# Patient Record
(demographics unavailable — no encounter records)

---

## 2024-10-08 NOTE — CONSULT LETTER
[FreeTextEntry1] : Dear Dr. Enrique Mckeon,   I had the pleasure of seeing your patient ALEXANDREA WEBBER in the office today.  My office note is attached. PLEASE READ THE "ASSESSMENT" SECTION OF THE NOTE TO SEE MY IMPRESSION AND PLAN.   Thank you very much for allowing me to participate in the care of your patient.   Sincerely,   De Saez M.D., FAC, FACP Director, Celiac Program at [facility]  of Medicine, [employer] at [facility] Adjunct  of Medicine, [facility] Practice Director, [facility] - Gastroenterology at [address] - [address] [place] [address] Tel: [phone] Email: [email or web address]     The attached note has been created using a voice recognition system ([email or web address]).  There may be some misspellings and typos.  Please call my office if you have any issues or questions.

## 2024-10-08 NOTE — ASSESSMENT
[FreeTextEntry1] : Patient with H. pylori infection.  She also had adenomatous colonic polyps.  We will treat the H. pylori with tetracycline 500 mg 3 times daily, metronidazole 500 mg 3 times daily, Pepto-Bismol 2 tablets 3 times a day, and pantoprazole 40 mg twice daily for 14 days.  I had a long discussion with the patient regarding H. pylori, the potential side effects of the medication, and the need to avoid alcohol for the entirety of the treatment and for 3 days afterwards.  We also discussed the potential for failure of the treatment to eradicate the infection.  The patient will go for an H. pylori breath test 6 weeks after the completion of treatment to evaluate for eradication.  We will repeat a colonoscopy in 3 years that is September 2027.   Plan from 5/3/2024 - Patient in need of an initial screening colonoscopy.  She has constipation that appears to have responded to fiber.  She also complains of bloating and nausea.  It is possible that at least some component of the bloating is related to the flax.  An EGD and colonoscopy have been scheduled. The risks, benefits, alternatives, and limitations of the procedures, including the possibility of missed lesions, were explained.  We will change the patient from flax to Citrucel powder in 8 ounces of water once a day.  Hopefully, this will lead to less bloating.  The patient was also advised to drink at least 64 ounces of water a day.

## 2024-10-08 NOTE — HISTORY OF PRESENT ILLNESS
[FreeTextEntry1] : We reviewed the patient's EGD and colonoscopy performed on September 3, 2024 and September 20, 2024 respectively.  EGD was significant for an irregular Z-line with biopsies showing H. pylori infection in the stomach.  There were no other significant findings on endoscopic biopsies.  Colonoscopy was significant for removal of 2 polyps, both of which were tubular adenomas.  The patient also has internal and external hemorrhoids which are asymptomatic.  She feels well denying abdominal pain, heartburn, nausea, vomiting, dysphagia.  Bowel movements are normal without melena or bright red blood per rectum.   Note from 5/3/2024 - The patient is a 49-year-old woman who presents for an initial screening colonoscopy.  She gets intermittent bloating and nausea.  She denies vomiting.  She denies heartburn or dysphagia.  She has had constipation going as long as 1 week without a bowel movement.  She has been using flax and now reports 1 bowel movement a day.  She denies melena or bright red blood per rectum.  The patient's weight is stable.  She is on an unknown cholesterol medication.  She has never had a colonoscopy.  The patient has not been admitted to the hospital in the past year and denies any cardiac issues.